# Patient Record
Sex: FEMALE | HISPANIC OR LATINO
[De-identification: names, ages, dates, MRNs, and addresses within clinical notes are randomized per-mention and may not be internally consistent; named-entity substitution may affect disease eponyms.]

---

## 2021-08-03 PROBLEM — Z00.00 ENCOUNTER FOR PREVENTIVE HEALTH EXAMINATION: Status: ACTIVE | Noted: 2021-08-03

## 2021-08-04 ENCOUNTER — APPOINTMENT (OUTPATIENT)
Dept: PEDIATRIC ORTHOPEDIC SURGERY | Facility: CLINIC | Age: 56
End: 2021-08-04
Payer: MEDICAID

## 2021-08-04 VITALS — BODY MASS INDEX: 21.26 KG/M2 | WEIGHT: 120 LBS | HEIGHT: 63 IN

## 2021-08-04 DIAGNOSIS — M17.12 UNILATERAL PRIMARY OSTEOARTHRITIS, LEFT KNEE: ICD-10-CM

## 2021-08-04 DIAGNOSIS — Z78.9 OTHER SPECIFIED HEALTH STATUS: ICD-10-CM

## 2021-08-04 DIAGNOSIS — Z80.9 FAMILY HISTORY OF MALIGNANT NEOPLASM, UNSPECIFIED: ICD-10-CM

## 2021-08-04 PROCEDURE — 20610 DRAIN/INJ JOINT/BURSA W/O US: CPT

## 2021-08-04 PROCEDURE — 99203 OFFICE O/P NEW LOW 30 MIN: CPT | Mod: 25

## 2021-08-04 PROCEDURE — 73564 X-RAY EXAM KNEE 4 OR MORE: CPT

## 2021-08-04 RX ORDER — MELOXICAM 15 MG/1
15 TABLET ORAL
Qty: 30 | Refills: 0 | Status: ACTIVE | COMMUNITY
Start: 2021-08-04 | End: 1900-01-01

## 2021-08-04 NOTE — HISTORY OF PRESENT ILLNESS
[de-identified] : This 55-year-old healthy young lady is seen for evaluation of the left knee.  This patient for a long period of time has had significant pain to her left knee with no obvious traumatic event.  She has been working as a  doing impact activities.  She of recent has had marked increase in her level of discomfort with stiffness pain and difficulty bearing weight.  No obvious locking or buckling.  In the past she has had gel injections.  She is not taking any medications.

## 2021-08-04 NOTE — PROCEDURE
[Injection] : Injection [Left] : of the left [Knee Joint] : knee joint [Osteoarthritis] : Osteoarthritis [Joint Pain] : Joint pain [Patient] : patient [Risk] : risk [Benefits] : benefits [Alternatives] : alternatives [Infection] : infection [Allergic Reaction] : allergic reaction [Verbal Consent Obtained] : verbal consent was obtained prior to the procedure [Betadine] : Betadine [Medial] : medial [22] : a 22-gauge [1% Lidocaine___(mL)] : [unfilled] mL of 1% Lidocaine [Methylpred. 40mg/mL___(mL)] : [unfilled] mL of 40mg/mL methylprednisolone [Bandage Applied] : a bandage [Tolerated Well] : The patient tolerated the procedure well [None] : none [No Strenuous Activity___day(s)] : avoid strenuous activity for [unfilled] day(s) [PRN] : as needed

## 2021-08-04 NOTE — ASSESSMENT
[FreeTextEntry1] : Impression: Severe symptomatic arthritis mainly patellofemoral left knee.\par \par This patient has been placed on Mobic physical therapy ordered.  Under sterile technique the knee was injected with 80 mg of Depo-Medrol and 2 cc of 1% lidocaine uneventfully.  I have strongly advised this patient continue to work as a  is likely to increase her symptomatology.  Her situation at this time is clearly she is headed for a total joint replacement in the future.

## 2021-08-04 NOTE — PHYSICAL EXAM
[de-identified] : Her examination today reveals a painful gait on the left side.  She has obvious soft tissue swelling to the knee with a moderate effusion present.  There is obvious crepitus on motion.  Range of motion is 5 degrees-100.  No instability on stress of the cruciate/collateral ligaments.  She has subluxation of the patellofemoral joint with severe crepitus on motion and pain.  I cannot dislocate the knee though the patella is clearly in a subluxed position with maltracking.  She is tender about the medial compartment and joint line with a mildly positive Steinmann maneuver.  Mild discomfort in the lateral compartment as well.  Popliteal fossa calf neurovascular exam are negative.\par \par X-rays of the left knee taken today standing revealed advanced arthritis affecting the patellofemoral joint and medial compartment with maltracking of the patella